# Patient Record
Sex: MALE | Race: WHITE | NOT HISPANIC OR LATINO | Employment: FULL TIME | URBAN - METROPOLITAN AREA
[De-identification: names, ages, dates, MRNs, and addresses within clinical notes are randomized per-mention and may not be internally consistent; named-entity substitution may affect disease eponyms.]

---

## 2023-04-15 LAB
ALANINE AMINOTRANSFERASE (SGPT) (U/L) IN SER/PLAS: 39 U/L (ref 10–52)
ALBUMIN (G/DL) IN SER/PLAS: 4.3 G/DL (ref 3.4–5)
ALBUMIN (MG/L) IN URINE: 22.5 MG/L
ALBUMIN/CREATININE (UG/MG) IN URINE: 10.8 UG/MG CRT (ref 0–30)
ALKALINE PHOSPHATASE (U/L) IN SER/PLAS: 55 U/L (ref 33–120)
ANION GAP IN SER/PLAS: 9 MMOL/L (ref 10–20)
ASPARTATE AMINOTRANSFERASE (SGOT) (U/L) IN SER/PLAS: 22 U/L (ref 9–39)
BILIRUBIN TOTAL (MG/DL) IN SER/PLAS: 0.9 MG/DL (ref 0–1.2)
CALCIUM (MG/DL) IN SER/PLAS: 9.4 MG/DL (ref 8.6–10.3)
CARBON DIOXIDE, TOTAL (MMOL/L) IN SER/PLAS: 30 MMOL/L (ref 21–32)
CHLORIDE (MMOL/L) IN SER/PLAS: 107 MMOL/L (ref 98–107)
CHOLESTEROL (MG/DL) IN SER/PLAS: 96 MG/DL (ref 0–199)
CHOLESTEROL IN HDL (MG/DL) IN SER/PLAS: 39.3 MG/DL
CHOLESTEROL IN LDL (MG/DL) IN SER/PLAS BY DIRECT ASSAY: 54 MG/DL (ref 0–129)
CHOLESTEROL/HDL RATIO: 2.4
CREATININE (MG/DL) IN SER/PLAS: 0.8 MG/DL (ref 0.5–1.3)
CREATININE (MG/DL) IN URINE: 209 MG/DL (ref 20–370)
ERYTHROCYTE DISTRIBUTION WIDTH (RATIO) BY AUTOMATED COUNT: 12.8 % (ref 11.5–14.5)
ERYTHROCYTE MEAN CORPUSCULAR HEMOGLOBIN CONCENTRATION (G/DL) BY AUTOMATED: 34.1 G/DL (ref 32–36)
ERYTHROCYTE MEAN CORPUSCULAR VOLUME (FL) BY AUTOMATED COUNT: 90 FL (ref 80–100)
ERYTHROCYTES (10*6/UL) IN BLOOD BY AUTOMATED COUNT: 4.87 X10E12/L (ref 4.5–5.9)
ESTIMATED AVERAGE GLUCOSE FOR HBA1C: 160 MG/DL
GFR MALE: >90 ML/MIN/1.73M2
GLUCOSE (MG/DL) IN SER/PLAS: 130 MG/DL (ref 74–99)
HEMATOCRIT (%) IN BLOOD BY AUTOMATED COUNT: 43.7 % (ref 41–52)
HEMOGLOBIN (G/DL) IN BLOOD: 14.9 G/DL (ref 13.5–17.5)
HEMOGLOBIN A1C/HEMOGLOBIN TOTAL IN BLOOD: 7.2 %
LDL: 45 MG/DL (ref 0–99)
LEUKOCYTES (10*3/UL) IN BLOOD BY AUTOMATED COUNT: 5.2 X10E9/L (ref 4.4–11.3)
PLATELETS (10*3/UL) IN BLOOD AUTOMATED COUNT: 183 X10E9/L (ref 150–450)
POTASSIUM (MMOL/L) IN SER/PLAS: 4.8 MMOL/L (ref 3.5–5.3)
PROTEIN TOTAL: 6.9 G/DL (ref 6.4–8.2)
SODIUM (MMOL/L) IN SER/PLAS: 141 MMOL/L (ref 136–145)
THYROTROPIN (MIU/L) IN SER/PLAS BY DETECTION LIMIT <= 0.05 MIU/L: 1.9 MIU/L (ref 0.44–3.98)
TRIGLYCERIDE (MG/DL) IN SER/PLAS: 59 MG/DL (ref 0–149)
UREA NITROGEN (MG/DL) IN SER/PLAS: 19 MG/DL (ref 6–23)
VLDL: 12 MG/DL (ref 0–40)

## 2023-04-17 PROBLEM — E78.5 HYPERLIPIDEMIA: Status: ACTIVE | Noted: 2023-04-17

## 2023-04-17 PROBLEM — K21.9 GERD (GASTROESOPHAGEAL REFLUX DISEASE): Status: ACTIVE | Noted: 2023-04-17

## 2023-04-17 PROBLEM — E11.9 DIABETES TYPE 2, CONTROLLED (MULTI): Status: ACTIVE | Noted: 2023-04-17

## 2023-04-17 PROBLEM — R19.03 ABDOMINAL MASS, RLQ (RIGHT LOWER QUADRANT): Status: RESOLVED | Noted: 2023-04-17 | Resolved: 2023-04-17

## 2023-04-17 PROBLEM — I10 ESSENTIAL HYPERTENSION, BENIGN: Status: ACTIVE | Noted: 2023-04-17

## 2023-04-17 PROBLEM — R93.1 AGATSTON CORONARY ARTERY CALCIUM SCORE LESS THAN 100: Status: ACTIVE | Noted: 2023-04-17

## 2023-04-17 RX ORDER — ATORVASTATIN CALCIUM 20 MG/1
1 TABLET, FILM COATED ORAL NIGHTLY
COMMUNITY
Start: 2020-06-04 | End: 2023-05-11 | Stop reason: SDUPTHER

## 2023-04-17 RX ORDER — ASPIRIN 81 MG/1
1 TABLET ORAL DAILY
COMMUNITY
Start: 2021-02-12 | End: 2023-05-11 | Stop reason: SDUPTHER

## 2023-04-17 RX ORDER — METFORMIN HYDROCHLORIDE 1000 MG/1
1 TABLET ORAL 2 TIMES DAILY
COMMUNITY
Start: 2022-06-10 | End: 2023-05-11 | Stop reason: SDUPTHER

## 2023-04-17 RX ORDER — PANTOPRAZOLE SODIUM 40 MG/1
1 TABLET, DELAYED RELEASE ORAL DAILY
COMMUNITY
Start: 2020-06-04 | End: 2023-10-20 | Stop reason: SDUPTHER

## 2023-04-17 RX ORDER — HYDROCHLOROTHIAZIDE 12.5 MG/1
1 TABLET ORAL DAILY
COMMUNITY
Start: 2020-06-04 | End: 2023-05-11 | Stop reason: SDUPTHER

## 2023-04-17 RX ORDER — LISINOPRIL 10 MG/1
1 TABLET ORAL DAILY
COMMUNITY
Start: 2020-06-04 | End: 2023-05-11 | Stop reason: SDUPTHER

## 2023-04-17 NOTE — ASSESSMENT & PLAN NOTE
A1c up a little from 6.8 to 7.2  Continue current medication  Work on diet reviewed with patient.  Reevaluate in 4 months.

## 2023-04-17 NOTE — PATIENT INSTRUCTIONS
I would like you to follow up in 4 months  Please have all labs that were ordered done at least 1 week prior to your visit.    Continue current medication.  Continue work on diet - recommend lots of fruits and vegetables, lean protein like chicken, turkey, fish, beans and Greek yogurt. Try to choose healthier carbohydrate options like oatmeal, wheat bread and pasta, sweet potatoes. Limit sugary treats.  Check a fasting sugar first thing in the AM twice daily and keep a log of the results to bring to your next office visit.  Please contact office if your sugars are consistently >140.  Reevaluate in 4 months    Recommend healthy diet based around fruits, vegetables, and lean proteins such as chicken, turkey, fish, and beans.  Also include moderate portions of healthy carbohydrates such as wheat bread and pasta, sweet potatoes. Limit sweets and alcoholic beverages. Try not drink more than 100 calories in beverages daily.   It is important to get a protein-rich breakfast daily such as oatmeal, eggs or Greek yogurt.  Increase activity as able to a recommended goal of at least 30 minutes of cardiovascular exercise (walking, swimming, biking, jogging etc.) at least 5 days weekly and a goal of 45 minutes or more most days of the week for weight loss. This exercise can be done all at one time or broken up into 2 or more sessions throughout the day.  .

## 2023-04-17 NOTE — PROGRESS NOTES
"Subjective   Patient ID: Alberto Zacarias is a 57 y.o. male who presents for follow up labs.    HPI  Patient presents today for follow up labs and chronic conditions.  Patient feels well. No other complaints or concerns.    The patient's relevant past medical, surgical, family, and social history was reviewed in [facility].  All pertinent lab work and results for this visit were reviewed with patient.      Review of Systems   A complete review of systems was performed and all systems were normal except what is noted in the HPI.        Objective   /82   Pulse 55   Temp 36.7 °C (98.1 °F)   Ht 1.778 m (5' 10\")   Wt 98.2 kg (216 lb 6.4 oz)   SpO2 94%   BMI 31.05 kg/m²    Physical Exam  Constitutional:       Appearance: Normal appearance. He is obese.   HENT:      Head: Normocephalic and atraumatic.   Neck:      Vascular: No carotid bruit.   Cardiovascular:      Rate and Rhythm: Normal rate and regular rhythm.      Heart sounds: Normal heart sounds.   Pulmonary:      Effort: Pulmonary effort is normal.      Breath sounds: Normal breath sounds. No wheezing, rhonchi or rales.   Abdominal:      General: Abdomen is flat. Bowel sounds are normal.      Palpations: Abdomen is soft.      Tenderness: There is no abdominal tenderness. There is no guarding.   Musculoskeletal:         General: Normal range of motion.      Right lower leg: No edema.      Left lower leg: No edema.   Skin:     General: Skin is dry.   Neurological:      General: No focal deficit present.      Mental Status: He is alert and oriented to person, place, and time.   Psychiatric:         Mood and Affect: Mood normal.         Behavior: Behavior normal.         Thought Content: Thought content normal.         Health Maintenance Due   Topic Date Due    HIV Screening  Never done    Pneumococcal Vaccine: Pediatrics (0 to 5 Years) and At-Risk Patients (6 to 64 Years) (1 - PCV) Never done    Diabetes: Foot Exam  Never done    Diabetes: Retinopathy Screening  " Never done    Hepatitis C Screening  Never done    COVID-19 Vaccine (4 - Booster for Moderna series) 01/07/2023    DTaP/Tdap/Td Vaccines (2 - Td or Tdap) 01/11/2023    Yearly Adult Physical  06/11/2023    Declines prevnar 20 and boostrix - Risks, benefits and side effects reviewed with patient.      Assessment/Plan   Problem List Items Addressed This Visit       Diabetes type 2, controlled (CMS/HCC) - Primary     A1c up a little from 6.8 to 7.2  Continue current medication  Work on diet reviewed with patient.  Reevaluate in 4 months.             Relevant Orders    Comprehensive Metabolic Panel    Hemoglobin A1C    Vitamin B12    Essential hypertension, benign     Well controlled continue current medication. Reevaluate in 4 months.            Relevant Orders    Comprehensive Metabolic Panel    Hyperlipidemia     Well controlled continue current medication. Reevaluate in 4 months.            Relevant Orders    Cholesterol, LDL Direct    Lipid Panel     Other Visit Diagnoses       Encounter for hepatitis C screening test for low risk patient        Relevant Orders    Hepatitis C Antibody    Screening PSA (prostate specific antigen)        Relevant Orders    Prostate Specific Antigen              Patient understands and agrees with care plan.           Risa Dubose MD

## 2023-04-21 ENCOUNTER — OFFICE VISIT (OUTPATIENT)
Dept: PRIMARY CARE | Facility: CLINIC | Age: 58
End: 2023-04-21
Payer: COMMERCIAL

## 2023-04-21 VITALS
TEMPERATURE: 98.1 F | SYSTOLIC BLOOD PRESSURE: 130 MMHG | BODY MASS INDEX: 30.98 KG/M2 | HEIGHT: 70 IN | HEART RATE: 55 BPM | DIASTOLIC BLOOD PRESSURE: 82 MMHG | WEIGHT: 216.4 LBS | OXYGEN SATURATION: 94 %

## 2023-04-21 DIAGNOSIS — E78.2 MIXED HYPERLIPIDEMIA: ICD-10-CM

## 2023-04-21 DIAGNOSIS — Z11.59 ENCOUNTER FOR HEPATITIS C SCREENING TEST FOR LOW RISK PATIENT: ICD-10-CM

## 2023-04-21 DIAGNOSIS — E11.9 CONTROLLED TYPE 2 DIABETES MELLITUS WITHOUT COMPLICATION, WITHOUT LONG-TERM CURRENT USE OF INSULIN (MULTI): Primary | ICD-10-CM

## 2023-04-21 DIAGNOSIS — I10 ESSENTIAL HYPERTENSION, BENIGN: ICD-10-CM

## 2023-04-21 DIAGNOSIS — Z12.5 SCREENING PSA (PROSTATE SPECIFIC ANTIGEN): ICD-10-CM

## 2023-04-21 PROCEDURE — 1036F TOBACCO NON-USER: CPT | Performed by: FAMILY MEDICINE

## 2023-04-21 PROCEDURE — 3079F DIAST BP 80-89 MM HG: CPT | Performed by: FAMILY MEDICINE

## 2023-04-21 PROCEDURE — 3075F SYST BP GE 130 - 139MM HG: CPT | Performed by: FAMILY MEDICINE

## 2023-04-21 PROCEDURE — 4010F ACE/ARB THERAPY RXD/TAKEN: CPT | Performed by: FAMILY MEDICINE

## 2023-04-21 PROCEDURE — 3051F HG A1C>EQUAL 7.0%<8.0%: CPT | Performed by: FAMILY MEDICINE

## 2023-04-21 PROCEDURE — 99214 OFFICE O/P EST MOD 30 MIN: CPT | Performed by: FAMILY MEDICINE

## 2023-04-21 ASSESSMENT — PATIENT HEALTH QUESTIONNAIRE - PHQ9
1. LITTLE INTEREST OR PLEASURE IN DOING THINGS: NOT AT ALL
SUM OF ALL RESPONSES TO PHQ9 QUESTIONS 1 AND 2: 0
2. FEELING DOWN, DEPRESSED OR HOPELESS: NOT AT ALL

## 2023-05-11 ENCOUNTER — TELEPHONE (OUTPATIENT)
Dept: PRIMARY CARE | Facility: CLINIC | Age: 58
End: 2023-05-11
Payer: COMMERCIAL

## 2023-05-11 DIAGNOSIS — E11.9 CONTROLLED TYPE 2 DIABETES MELLITUS WITHOUT COMPLICATION, WITHOUT LONG-TERM CURRENT USE OF INSULIN (MULTI): ICD-10-CM

## 2023-05-11 DIAGNOSIS — R93.1 AGATSTON CORONARY ARTERY CALCIUM SCORE LESS THAN 100: ICD-10-CM

## 2023-05-11 DIAGNOSIS — E78.2 MIXED HYPERLIPIDEMIA: ICD-10-CM

## 2023-05-11 DIAGNOSIS — I10 ESSENTIAL HYPERTENSION, BENIGN: ICD-10-CM

## 2023-05-11 NOTE — TELEPHONE ENCOUNTER
Rx Refill Request Telephone Encounter    Name:  Alberto Zacarias  :  [id number]  Medication Name:      Aspirin 81 MG EC Tablet    Atorvastatin 20 MG Tablet    Hydrochlorothiazide 12/5 MG Tablet    Lisinopril 10 MG Tablet    Metformin 1000 MG Tablet    Pantoprazole 40 MG EC Tablet    Propranolol XL 80 MG 24 HR Capsule       Specific Pharmacy location: [email or web address] - [address]    Date of last appointment: 23   Date of next appointment: 10/20/23   Best number to reach patient: [phone]

## 2023-05-12 RX ORDER — ASPIRIN 81 MG/1
81 TABLET ORAL DAILY
Qty: 90 TABLET | Refills: 2 | Status: SHIPPED | OUTPATIENT
Start: 2023-05-12

## 2023-05-12 RX ORDER — ATORVASTATIN CALCIUM 20 MG/1
20 TABLET, FILM COATED ORAL NIGHTLY
Qty: 90 TABLET | Refills: 2 | Status: SHIPPED | OUTPATIENT
Start: 2023-05-12 | End: 2023-10-20 | Stop reason: SDUPTHER

## 2023-05-12 RX ORDER — HYDROCHLOROTHIAZIDE 12.5 MG/1
12.5 TABLET ORAL DAILY
Qty: 90 TABLET | Refills: 2 | Status: SHIPPED | OUTPATIENT
Start: 2023-05-12 | End: 2023-10-20 | Stop reason: SDUPTHER

## 2023-05-12 RX ORDER — LISINOPRIL 10 MG/1
10 TABLET ORAL DAILY
Qty: 90 TABLET | Refills: 2 | Status: SHIPPED | OUTPATIENT
Start: 2023-05-12 | End: 2023-10-20 | Stop reason: SDUPTHER

## 2023-05-12 RX ORDER — METFORMIN HYDROCHLORIDE 1000 MG/1
1000 TABLET ORAL 2 TIMES DAILY
Qty: 180 TABLET | Refills: 2 | Status: SHIPPED | OUTPATIENT
Start: 2023-05-12 | End: 2023-05-17 | Stop reason: SDUPTHER

## 2023-05-15 ENCOUNTER — TELEPHONE (OUTPATIENT)
Dept: PRIMARY CARE | Facility: CLINIC | Age: 58
End: 2023-05-15
Payer: COMMERCIAL

## 2023-05-15 DIAGNOSIS — R93.1 AGATSTON CORONARY ARTERY CALCIUM SCORE LESS THAN 100: ICD-10-CM

## 2023-05-15 NOTE — TELEPHONE ENCOUNTER
Rx Refill Request Telephone Encounter  I sent this medication Friday to be refilled but there was a problem with it originally. The pharmacy was attempting to charge Alberto $1,800 for the Innopran, and it needs to be resubmitted as Inderal. He did get this filled in late April and I made it clear to him that he would not receive this medication with his others and he was fine with that.   Name:  Alberto Zacarias  :  [id number]  Medication Name:  Propanolol (Inderal) 80 MG 24 HR Capsule       Specific Pharmacy location: [email or web address] - [address]    Date of last appointment: 23   Date of next appointment: 10/20/23  Best number to reach patient: [phone]

## 2023-05-17 ENCOUNTER — TELEPHONE (OUTPATIENT)
Dept: PRIMARY CARE | Facility: CLINIC | Age: 58
End: 2023-05-17
Payer: COMMERCIAL

## 2023-05-17 DIAGNOSIS — E11.9 CONTROLLED TYPE 2 DIABETES MELLITUS WITHOUT COMPLICATION, WITHOUT LONG-TERM CURRENT USE OF INSULIN (MULTI): ICD-10-CM

## 2023-05-17 RX ORDER — METFORMIN HYDROCHLORIDE 1000 MG/1
1000 TABLET ORAL 2 TIMES DAILY
Qty: 60 TABLET | Refills: 0 | Status: SHIPPED | OUTPATIENT
Start: 2023-05-17 | End: 2023-08-04 | Stop reason: SDUPTHER

## 2023-05-17 NOTE — TELEPHONE ENCOUNTER
Patient is totally out of his Metformin 1000 MG  and he is asking for a short supply to be sent to [facility] in [place] if possible. Can we please pend to Dr. Dubose?

## 2023-06-05 ENCOUNTER — TELEPHONE (OUTPATIENT)
Dept: PRIMARY CARE | Facility: CLINIC | Age: 58
End: 2023-06-05
Payer: COMMERCIAL

## 2023-06-05 NOTE — TELEPHONE ENCOUNTER
----- Message from Risa Dubose MD sent at 6/5/2023  2:25 PM EDT -----  Please notify patient coronary calcium score is 24. It has increased from a score of 4 in 2019 but is still overall low. Encourage heart healthy diet, Increase physical activity as able. No other changes at this time.

## 2023-07-06 DIAGNOSIS — Z12.11 ENCOUNTER FOR SCREENING FOR MALIGNANT NEOPLASM OF COLON: Primary | ICD-10-CM

## 2023-08-04 ENCOUNTER — TELEPHONE (OUTPATIENT)
Dept: PRIMARY CARE | Facility: CLINIC | Age: 58
End: 2023-08-04
Payer: COMMERCIAL

## 2023-08-04 DIAGNOSIS — E11.9 CONTROLLED TYPE 2 DIABETES MELLITUS WITHOUT COMPLICATION, WITHOUT LONG-TERM CURRENT USE OF INSULIN (MULTI): ICD-10-CM

## 2023-08-04 RX ORDER — METFORMIN HYDROCHLORIDE 1000 MG/1
1000 TABLET ORAL 2 TIMES DAILY
Qty: 180 TABLET | Refills: 3 | Status: SHIPPED | OUTPATIENT
Start: 2023-08-04 | End: 2023-10-20 | Stop reason: SDUPTHER

## 2023-08-04 NOTE — TELEPHONE ENCOUNTER
Rx Refill Request Telephone Encounter    Name:  Alberto Zacarias  :  [id number]  Medication Name:        Metformin 1000 MG Tablet      Specific Pharmacy location:   [facility] - [address]         Date of last appointment:  23  Date of next appointment: 10/20/23   Best number to reach patient: [phone]

## 2023-09-27 LAB — NONINV COLON CA DNA+OCC BLD SCRN STL QL: NEGATIVE

## 2023-09-30 ENCOUNTER — LAB (OUTPATIENT)
Dept: LAB | Facility: LAB | Age: 58
End: 2023-09-30
Payer: COMMERCIAL

## 2023-09-30 DIAGNOSIS — E78.2 MIXED HYPERLIPIDEMIA: ICD-10-CM

## 2023-09-30 DIAGNOSIS — Z11.59 ENCOUNTER FOR HEPATITIS C SCREENING TEST FOR LOW RISK PATIENT: ICD-10-CM

## 2023-09-30 DIAGNOSIS — Z12.5 SCREENING PSA (PROSTATE SPECIFIC ANTIGEN): ICD-10-CM

## 2023-09-30 DIAGNOSIS — I10 ESSENTIAL HYPERTENSION, BENIGN: ICD-10-CM

## 2023-09-30 DIAGNOSIS — E11.9 CONTROLLED TYPE 2 DIABETES MELLITUS WITHOUT COMPLICATION, WITHOUT LONG-TERM CURRENT USE OF INSULIN (MULTI): ICD-10-CM

## 2023-09-30 LAB
ALBUMIN SERPL BCP-MCNC: 4.4 G/DL (ref 3.4–5)
ALP SERPL-CCNC: 54 U/L (ref 33–120)
ALT SERPL W P-5'-P-CCNC: 24 U/L (ref 10–52)
ANION GAP SERPL CALC-SCNC: 11 MMOL/L (ref 10–20)
AST SERPL W P-5'-P-CCNC: 20 U/L (ref 9–39)
BILIRUB SERPL-MCNC: 0.8 MG/DL (ref 0–1.2)
BUN SERPL-MCNC: 23 MG/DL (ref 6–23)
CALCIUM SERPL-MCNC: 9.2 MG/DL (ref 8.6–10.3)
CHLORIDE SERPL-SCNC: 107 MMOL/L (ref 98–107)
CHOLEST SERPL-MCNC: 119 MG/DL (ref 0–199)
CHOLESTEROL/HDL RATIO: 2.4
CO2 SERPL-SCNC: 29 MMOL/L (ref 21–32)
CREAT SERPL-MCNC: 0.77 MG/DL (ref 0.5–1.3)
GFR SERPL CREATININE-BSD FRML MDRD: >90 ML/MIN/1.73M*2
GLUCOSE SERPL-MCNC: 93 MG/DL (ref 74–99)
HDLC SERPL-MCNC: 49.1 MG/DL
LDLC SERPL CALC-MCNC: 61 MG/DL (ref 140–190)
NON HDL CHOLESTEROL: 70 MG/DL (ref 0–149)
POTASSIUM SERPL-SCNC: 4.3 MMOL/L (ref 3.5–5.3)
PROT SERPL-MCNC: 6.8 G/DL (ref 6.4–8.2)
PSA SERPL-MCNC: 0.48 NG/ML
SODIUM SERPL-SCNC: 143 MMOL/L (ref 136–145)
TRIGL SERPL-MCNC: 43 MG/DL (ref 0–149)
VIT B12 SERPL-MCNC: 209 PG/ML (ref 211–911)
VLDL: 9 MG/DL (ref 0–40)

## 2023-09-30 PROCEDURE — 84153 ASSAY OF PSA TOTAL: CPT

## 2023-09-30 PROCEDURE — 80061 LIPID PANEL: CPT

## 2023-09-30 PROCEDURE — 83721 ASSAY OF BLOOD LIPOPROTEIN: CPT

## 2023-09-30 PROCEDURE — 80053 COMPREHEN METABOLIC PANEL: CPT

## 2023-09-30 PROCEDURE — 83036 HEMOGLOBIN GLYCOSYLATED A1C: CPT

## 2023-09-30 PROCEDURE — 36415 COLL VENOUS BLD VENIPUNCTURE: CPT

## 2023-09-30 PROCEDURE — 82607 VITAMIN B-12: CPT

## 2023-10-01 LAB
EST. AVERAGE GLUCOSE BLD GHB EST-MCNC: 128 MG/DL
HBA1C MFR BLD: 6.1 %
Lab: 59 MG/DL

## 2023-10-09 PROBLEM — Z00.00 ANNUAL PHYSICAL EXAM: Status: ACTIVE | Noted: 2023-10-09

## 2023-10-09 PROBLEM — E53.8 B12 DEFICIENCY: Status: ACTIVE | Noted: 2023-10-09

## 2023-10-13 PROBLEM — E78.2 MIXED HYPERLIPIDEMIA: Status: ACTIVE | Noted: 2023-04-17

## 2023-10-13 PROBLEM — E66.09 CLASS 1 OBESITY DUE TO EXCESS CALORIES WITH SERIOUS COMORBIDITY AND BODY MASS INDEX (BMI) OF 31.0 TO 31.9 IN ADULT: Status: ACTIVE | Noted: 2023-10-13

## 2023-10-13 PROBLEM — E66.811 CLASS 1 OBESITY DUE TO EXCESS CALORIES WITH SERIOUS COMORBIDITY AND BODY MASS INDEX (BMI) OF 31.0 TO 31.9 IN ADULT: Status: ACTIVE | Noted: 2023-10-13

## 2023-10-13 NOTE — PROGRESS NOTES
Subjective   Patient ID: Alberto Zacarias is a 57 y.o. male who presents for Annual Exam.    HPI  Patient presents today for Annual Physical.  Working on diet - weight down 16 lbs.    Patient otherwise feels well. No other complaints or concerns.    The patient's relevant past medical, surgical, family, and social history was reviewed in [facility].  All pertinent lab work and results for this visit were reviewed with patient.    Lab on 09/30/2023   Component Date Value Ref Range Status    LDL, Direct 09/30/2023 59  mg/dL Final    Cholesterol 09/30/2023 119  0 - 199 mg/dL Final          Age      Desirable   Borderline High   High     0-19 Y     0 - 169       170 - 199     >/= 200    20-24 Y     0 - 189       190 - 224     >/= 225         >24 Y     0 - 199       200 - 239     >/= 240   **All ranges are based on fasting samples. Specific   therapeutic targets will vary based on patient-specific   cardiac risk.    Pediatric guidelines reference:Pediatrics 2011, 128(S5).Adult guidelines reference: NCEP ATPIII Guidelines,GIL 2001, 258:2486-97    Venipuncture immediately after or during the administration of Metamizole may lead to falsely low results. Testing should be performed immediately prior to Metamizole dosing.    HDL-Cholesterol 09/30/2023 49.1  mg/dL Final      Age       Very Low   Low     Normal    High    0-19 Y    < 35      < 40     40-45     ----  20-24 Y    ----     < 40      >45      ----        >24 Y      ----     < 40     40-60      >60      Cholesterol/HDL Ratio 09/30/2023 2.4   Final      Ref Values  Desirable  < 3.4  High Risk  > 5.0    LDL Calculated 09/30/2023 61 (L)  140 - 190 mg/dL Final                                Near   Borderline      AGE      Desirable  Optimal    High     High     Very High     0-19 Y     0 - 109     ---    110-129   >/= 130     ----    20-24 Y     0 - 119     ---    120-159   >/= 160     ----      >24 Y     0 -  99   100-129  130-159   160-189     >/=190      VLDL 09/30/2023  9  0 - 40 mg/dL Final    Triglycerides 09/30/2023 43  0 - 149 mg/dL Final       Age         Desirable   Borderline High   High     Very High   0 D-90 D    19 - 174         ----         ----        ----  91 D- 9 Y     0 -  74        75 -  99     >/= 100      ----    10-19 Y     0 -  89        90 - 129     >/= 130      ----    20-24 Y     0 - 114       115 - 149     >/= 150      ----         >24 Y     0 - 149       150 - 199    200- 499    >/= 500    Venipuncture immediately after or during the administration of Metamizole may lead to falsely low results. Testing should be performed immediately prior to Metamizole dosing.    Non HDL Cholesterol 09/30/2023 70  0 - 149 mg/dL Final          Age       Desirable   Borderline High   High     Very High     0-19 Y     0 - 119       120 - 144     >/= 145    >/= 160    20-24 Y     0 - 149       150 - 189     >/= 190      ----         >24 Y    30 mg/dL above LDL Cholesterol goal      Glucose 09/30/2023 93  74 - 99 mg/dL Final    Sodium 09/30/2023 143  136 - 145 mmol/L Final    Potassium 09/30/2023 4.3  3.5 - 5.3 mmol/L Final    Chloride 09/30/2023 107  98 - 107 mmol/L Final    Bicarbonate 09/30/2023 29  21 - 32 mmol/L Final    Anion Gap 09/30/2023 11  10 - 20 mmol/L Final    Urea Nitrogen 09/30/2023 23  6 - 23 mg/dL Final    Creatinine 09/30/2023 0.77  0.50 - 1.30 mg/dL Final    eGFR 09/30/2023 >90  >60 mL/min/1.73m*2 Final    Calculations of estimated GFR are performed using the 2021 CKD-EPI Study Refit  equation without the race variable for the IDMS-Traceable Creatinine Methods.    https://jasn.asnjournals.org/content/early/2021/09/22/ASN.5534755085    Calcium 09/30/2023 9.2  8.6 - 10.3 mg/dL Final    Albumin 09/30/2023 4.4  3.4 - 5.0 g/dL Final    Alkaline Phosphatase 09/30/2023 54  33 - 120 U/L Final    Total Protein 09/30/2023 6.8  6.4 - 8.2 g/dL Final    AST 09/30/2023 20  9 - 39 U/L Final    Bilirubin, Total 09/30/2023 0.8  0.0 - 1.2 mg/dL Final    ALT 09/30/2023 24  10  "- 52 U/L Final    Patients treated with Sulfasalazine may generate falsely decreased results for ALT.    Hemoglobin A1C 09/30/2023 6.1 (H)  see below % Final    Estimated Average Glucose 09/30/2023 128  Not Established mg/dL Final    Prostate Specific AG 09/30/2023 0.48  <=4.00 ng/mL Final    Vitamin B12 09/30/2023 209 (L)  211 - 911 pg/mL Final           Review of Systems   A complete review of systems was performed and all systems were normal except what is noted in the HPI.        Objective   /84   Pulse 55   Temp 36.4 °C (97.6 °F)   Ht 1.778 m (5' 10\")   Wt 90.7 kg (200 lb)   SpO2 95%   BMI 28.70 kg/m²    Physical Exam  Constitutional:       Appearance: Normal appearance.   HENT:      Head: Normocephalic and atraumatic.   Neck:      Vascular: No carotid bruit.   Cardiovascular:      Rate and Rhythm: Normal rate and regular rhythm.      Heart sounds: Normal heart sounds.   Pulmonary:      Effort: Pulmonary effort is normal.      Breath sounds: Normal breath sounds. No wheezing, rhonchi or rales.   Abdominal:      General: Abdomen is flat. Bowel sounds are normal.      Palpations: Abdomen is soft.      Tenderness: There is no abdominal tenderness. There is no guarding.   Musculoskeletal:         General: Normal range of motion.      Right lower leg: No edema.      Left lower leg: No edema.   Skin:     General: Skin is dry.   Neurological:      General: No focal deficit present.      Mental Status: He is alert and oriented to person, place, and time.   Psychiatric:         Mood and Affect: Mood normal.         Behavior: Behavior normal.         Thought Content: Thought content normal.         Health Maintenance Due   Topic Date Due    HIV Screening  Never done    Pneumococcal Vaccine: Pediatrics (0 to 5 Years) and At-Risk Patients (6 to 64 Years) (1 - PCV) Never done    Diabetes: Foot Exam  Never done    Diabetes: Retinopathy Screening  Never done    Hepatitis C Screening  Never done    COVID-19 Vaccine " (4 - Moderna series) 01/07/2023    DTaP/Tdap/Td Vaccines (2 - Td or Tdap) 01/11/2023    Influenza Vaccine (1) Never done        Assessment/Plan   Problem List Items Addressed This Visit       Diabetes type 2, controlled (CMS/[facility])     A1c significantly improved to 6.1  Continue current medicationn ad work on diet  On ACE  On statin  Recheck 6 months          Relevant Medications    metFORMIN (Glucophage) 1,000 mg tablet    Other Relevant Orders    TSH with reflex to Free T4 if abnormal    Comprehensive Metabolic Panel    Albumin , Urine Random    Hemoglobin A1C    Essential hypertension, benign     Well controlled continue current medication. Reevaluate in 6 months.            Relevant Medications    hydroCHLOROthiazide (HYDRODiuril) 12.5 mg tablet    lisinopril 10 mg tablet    Other Relevant Orders    TSH with reflex to Free T4 if abnormal    Comprehensive Metabolic Panel    GERD (gastroesophageal reflux disease)    Relevant Medications    pantoprazole (ProtoNix) 40 mg EC tablet    Mixed hyperlipidemia     Well controlled continue current medication. Reevaluate in 6 months.            Relevant Medications    atorvastatin (Lipitor) 20 mg tablet    Other Relevant Orders    TSH with reflex to Free T4 if abnormal    Lipid Panel    Cholesterol, LDL Direct    Annual physical exam - Primary     Yearly physical done.  Flu shot, Boostrix, Prevnar 20 declined  Shingrix series complete.  PSA 10/23  Cologuard 9/23  Nonsmoker         B12 deficiency     B12 low  B12 injection today  Start OTC b12 as directed  Recheck 6 months          Relevant Medications    cyanocobalamin (Vitamin B-12) injection 1,000 mcg (Completed)    Other Relevant Orders    CBC    Vitamin B12    BMI 28.0-28.9,adult     Work on diet reviewed with patient - doing well - down 16 lbs   Recommend at least 150 minutes of cardiovascular exercise weekly.           Other Visit Diagnoses       Encounter for hepatitis C screening test for low risk patient         Relevant Orders    Hepatitis C Antibody              Patient understands and agrees with care plan.           Risa Dubose MD

## 2023-10-13 NOTE — ASSESSMENT & PLAN NOTE
Work on diet reviewed with patient - doing well - down 16 lbs   Recommend at least 150 minutes of cardiovascular exercise weekly.

## 2023-10-13 NOTE — ASSESSMENT & PLAN NOTE
Yearly physical done.  Flu shot, Boostrix, Prevnar 20 declined  Shingrix series complete.  PSA 10/23  Cologuard 9/23  Nonsmoker

## 2023-10-13 NOTE — PATIENT INSTRUCTIONS
I would like you to start OTC b12 1000 mcg daily.    I would like you to follow up in 6 months  Please have all labs that were ordered done at least 1 week prior to your visit.     Recommend healthy diet based around fruits, vegetables, and lean proteins such as chicken, turkey, fish, and beans.  Also include moderate portions of healthy carbohydrates such as wheat bread and pasta, sweet potatoes. Limit sweets and alcoholic beverages. Try not drink more than 100 calories in beverages daily.   It is important to get a protein-rich breakfast daily such as oatmeal, eggs or Greek yogurt.  Increase activity as able to a recommended goal of at least 30 minutes of cardiovascular exercise (walking, swimming, biking, jogging etc.) at least 5 days weekly and a goal of 45 minutes or more most days of the week for weight loss. This exercise can be done all at one time or broken up into 2 or more sessions throughout the day.

## 2023-10-13 NOTE — ASSESSMENT & PLAN NOTE
A1c significantly improved to 6.1  Continue current medicationn ad work on diet  On ACE  On statin  Recheck 6 months

## 2023-10-20 ENCOUNTER — OFFICE VISIT (OUTPATIENT)
Dept: PRIMARY CARE | Facility: CLINIC | Age: 58
End: 2023-10-20
Payer: COMMERCIAL

## 2023-10-20 VITALS
OXYGEN SATURATION: 95 % | SYSTOLIC BLOOD PRESSURE: 127 MMHG | HEIGHT: 70 IN | TEMPERATURE: 97.6 F | DIASTOLIC BLOOD PRESSURE: 84 MMHG | BODY MASS INDEX: 28.63 KG/M2 | HEART RATE: 55 BPM | WEIGHT: 200 LBS

## 2023-10-20 DIAGNOSIS — K21.9 GASTROESOPHAGEAL REFLUX DISEASE, UNSPECIFIED WHETHER ESOPHAGITIS PRESENT: ICD-10-CM

## 2023-10-20 DIAGNOSIS — E11.9 CONTROLLED TYPE 2 DIABETES MELLITUS WITHOUT COMPLICATION, WITHOUT LONG-TERM CURRENT USE OF INSULIN (MULTI): ICD-10-CM

## 2023-10-20 DIAGNOSIS — Z11.59 ENCOUNTER FOR HEPATITIS C SCREENING TEST FOR LOW RISK PATIENT: ICD-10-CM

## 2023-10-20 DIAGNOSIS — E53.8 B12 DEFICIENCY: ICD-10-CM

## 2023-10-20 DIAGNOSIS — E78.2 MIXED HYPERLIPIDEMIA: ICD-10-CM

## 2023-10-20 DIAGNOSIS — Z00.00 ANNUAL PHYSICAL EXAM: Primary | ICD-10-CM

## 2023-10-20 DIAGNOSIS — I10 ESSENTIAL HYPERTENSION, BENIGN: ICD-10-CM

## 2023-10-20 PROCEDURE — 1036F TOBACCO NON-USER: CPT | Performed by: FAMILY MEDICINE

## 2023-10-20 PROCEDURE — 99214 OFFICE O/P EST MOD 30 MIN: CPT | Performed by: FAMILY MEDICINE

## 2023-10-20 PROCEDURE — 3044F HG A1C LEVEL LT 7.0%: CPT | Performed by: FAMILY MEDICINE

## 2023-10-20 PROCEDURE — 3079F DIAST BP 80-89 MM HG: CPT | Performed by: FAMILY MEDICINE

## 2023-10-20 PROCEDURE — 4010F ACE/ARB THERAPY RXD/TAKEN: CPT | Performed by: FAMILY MEDICINE

## 2023-10-20 PROCEDURE — 3008F BODY MASS INDEX DOCD: CPT | Performed by: FAMILY MEDICINE

## 2023-10-20 PROCEDURE — 96372 THER/PROPH/DIAG INJ SC/IM: CPT | Performed by: NURSE PRACTITIONER

## 2023-10-20 PROCEDURE — 3074F SYST BP LT 130 MM HG: CPT | Performed by: FAMILY MEDICINE

## 2023-10-20 PROCEDURE — 99396 PREV VISIT EST AGE 40-64: CPT | Performed by: FAMILY MEDICINE

## 2023-10-20 PROCEDURE — 3048F LDL-C <100 MG/DL: CPT | Performed by: FAMILY MEDICINE

## 2023-10-20 RX ORDER — PANTOPRAZOLE SODIUM 40 MG/1
40 TABLET, DELAYED RELEASE ORAL DAILY
Qty: 90 TABLET | Refills: 3 | Status: SHIPPED | OUTPATIENT
Start: 2023-10-20 | End: 2024-10-19

## 2023-10-20 RX ORDER — CYANOCOBALAMIN 1000 UG/ML
1000 INJECTION, SOLUTION INTRAMUSCULAR; SUBCUTANEOUS ONCE
Status: COMPLETED | OUTPATIENT
Start: 2023-10-20 | End: 2023-10-20

## 2023-10-20 RX ORDER — HYDROCHLOROTHIAZIDE 12.5 MG/1
12.5 TABLET ORAL DAILY
Qty: 90 TABLET | Refills: 3 | Status: SHIPPED | OUTPATIENT
Start: 2023-10-20 | End: 2024-10-19

## 2023-10-20 RX ORDER — CYANOCOBALAMIN 1000 UG/ML
1000 INJECTION, SOLUTION INTRAMUSCULAR; SUBCUTANEOUS ONCE
Status: CANCELLED | OUTPATIENT
Start: 2023-10-20 | End: 2023-10-20

## 2023-10-20 RX ORDER — METFORMIN HYDROCHLORIDE 1000 MG/1
1000 TABLET ORAL 2 TIMES DAILY
Qty: 180 TABLET | Refills: 3 | Status: SHIPPED | OUTPATIENT
Start: 2023-10-20 | End: 2024-10-19

## 2023-10-20 RX ORDER — LISINOPRIL 10 MG/1
10 TABLET ORAL DAILY
Qty: 90 TABLET | Refills: 3 | Status: SHIPPED | OUTPATIENT
Start: 2023-10-20 | End: 2024-10-19

## 2023-10-20 RX ORDER — ATORVASTATIN CALCIUM 20 MG/1
20 TABLET, FILM COATED ORAL NIGHTLY
Qty: 90 TABLET | Refills: 3 | Status: SHIPPED | OUTPATIENT
Start: 2023-10-20 | End: 2024-10-19

## 2023-10-20 RX ORDER — LANOLIN ALCOHOL/MO/W.PET/CERES
1000 CREAM (GRAM) TOPICAL DAILY
COMMUNITY

## 2023-10-20 RX ADMIN — CYANOCOBALAMIN 1000 MCG: 1000 INJECTION, SOLUTION INTRAMUSCULAR; SUBCUTANEOUS at 09:31

## 2023-10-20 ASSESSMENT — PATIENT HEALTH QUESTIONNAIRE - PHQ9
2. FEELING DOWN, DEPRESSED OR HOPELESS: NOT AT ALL
SUM OF ALL RESPONSES TO PHQ9 QUESTIONS 1 AND 2: 0
1. LITTLE INTEREST OR PLEASURE IN DOING THINGS: NOT AT ALL

## 2024-04-20 ENCOUNTER — LAB (OUTPATIENT)
Dept: LAB | Facility: LAB | Age: 59
End: 2024-04-20
Payer: COMMERCIAL

## 2024-04-20 DIAGNOSIS — E53.8 B12 DEFICIENCY: ICD-10-CM

## 2024-04-20 DIAGNOSIS — E11.9 CONTROLLED TYPE 2 DIABETES MELLITUS WITHOUT COMPLICATION, WITHOUT LONG-TERM CURRENT USE OF INSULIN (MULTI): ICD-10-CM

## 2024-04-20 DIAGNOSIS — I10 ESSENTIAL HYPERTENSION, BENIGN: ICD-10-CM

## 2024-04-20 DIAGNOSIS — E78.2 MIXED HYPERLIPIDEMIA: ICD-10-CM

## 2024-04-20 DIAGNOSIS — Z11.59 ENCOUNTER FOR HEPATITIS C SCREENING TEST FOR LOW RISK PATIENT: ICD-10-CM

## 2024-04-20 LAB
ALBUMIN SERPL BCP-MCNC: 4.6 G/DL (ref 3.4–5)
ALP SERPL-CCNC: 60 U/L (ref 33–120)
ALT SERPL W P-5'-P-CCNC: 31 U/L (ref 10–52)
ANION GAP SERPL CALC-SCNC: 9 MMOL/L (ref 10–20)
AST SERPL W P-5'-P-CCNC: 21 U/L (ref 9–39)
BILIRUB SERPL-MCNC: 0.8 MG/DL (ref 0–1.2)
BUN SERPL-MCNC: 16 MG/DL (ref 6–23)
CALCIUM SERPL-MCNC: 9.6 MG/DL (ref 8.6–10.3)
CHLORIDE SERPL-SCNC: 105 MMOL/L (ref 98–107)
CHOLEST SERPL-MCNC: 115 MG/DL (ref 0–199)
CHOLESTEROL/HDL RATIO: 2.3
CO2 SERPL-SCNC: 30 MMOL/L (ref 21–32)
CREAT SERPL-MCNC: 0.79 MG/DL (ref 0.5–1.3)
CREAT UR-MCNC: 161.2 MG/DL (ref 20–370)
EGFRCR SERPLBLD CKD-EPI 2021: >90 ML/MIN/1.73M*2
ERYTHROCYTE [DISTWIDTH] IN BLOOD BY AUTOMATED COUNT: 12.7 % (ref 11.5–14.5)
EST. AVERAGE GLUCOSE BLD GHB EST-MCNC: 151 MG/DL
GLUCOSE SERPL-MCNC: 133 MG/DL (ref 74–99)
HBA1C MFR BLD: 6.9 %
HCT VFR BLD AUTO: 46.6 % (ref 41–52)
HCV AB SER QL: NONREACTIVE
HDLC SERPL-MCNC: 49 MG/DL
HGB BLD-MCNC: 15.4 G/DL (ref 13.5–17.5)
LDLC SERPL CALC-MCNC: 56 MG/DL
LDLC SERPL DIRECT ASSAY-MCNC: 61 MG/DL (ref 0–129)
MCH RBC QN AUTO: 30.3 PG (ref 26–34)
MCHC RBC AUTO-ENTMCNC: 33 G/DL (ref 32–36)
MCV RBC AUTO: 92 FL (ref 80–100)
MICROALBUMIN UR-MCNC: 16.9 MG/L
MICROALBUMIN/CREAT UR: 10.5 UG/MG CREAT
NON HDL CHOLESTEROL: 66 MG/DL (ref 0–149)
NRBC BLD-RTO: 0 /100 WBCS (ref 0–0)
PLATELET # BLD AUTO: 197 X10*3/UL (ref 150–450)
POTASSIUM SERPL-SCNC: 5.1 MMOL/L (ref 3.5–5.3)
PROT SERPL-MCNC: 7 G/DL (ref 6.4–8.2)
RBC # BLD AUTO: 5.08 X10*6/UL (ref 4.5–5.9)
SODIUM SERPL-SCNC: 139 MMOL/L (ref 136–145)
TRIGL SERPL-MCNC: 49 MG/DL (ref 0–149)
TSH SERPL-ACNC: 2.76 MIU/L (ref 0.44–3.98)
VIT B12 SERPL-MCNC: 1006 PG/ML (ref 211–911)
VLDL: 10 MG/DL (ref 0–40)
WBC # BLD AUTO: 4.6 X10*3/UL (ref 4.4–11.3)

## 2024-04-20 PROCEDURE — 85027 COMPLETE CBC AUTOMATED: CPT

## 2024-04-20 PROCEDURE — 83721 ASSAY OF BLOOD LIPOPROTEIN: CPT

## 2024-04-20 PROCEDURE — 82607 VITAMIN B-12: CPT

## 2024-04-20 PROCEDURE — 82043 UR ALBUMIN QUANTITATIVE: CPT

## 2024-04-20 PROCEDURE — 80053 COMPREHEN METABOLIC PANEL: CPT

## 2024-04-20 PROCEDURE — 80061 LIPID PANEL: CPT

## 2024-04-20 PROCEDURE — 83036 HEMOGLOBIN GLYCOSYLATED A1C: CPT

## 2024-04-20 PROCEDURE — 86803 HEPATITIS C AB TEST: CPT

## 2024-04-20 PROCEDURE — 84443 ASSAY THYROID STIM HORMONE: CPT

## 2024-04-20 PROCEDURE — 82570 ASSAY OF URINE CREATININE: CPT

## 2024-04-20 PROCEDURE — 36415 COLL VENOUS BLD VENIPUNCTURE: CPT

## 2024-04-22 NOTE — ASSESSMENT & PLAN NOTE
A1c up from 6.1 to 6.9  Work on diet reviewed with patient.   Continue current medication  On statin  On ACE  No microalbuminuria   Recheck 6 months

## 2024-04-22 NOTE — PROGRESS NOTES
Subjective   Patient ID: Alberto Zacarias is a 58 y.o. male who presents for No chief complaint on file..  HPI  Patient presents today for follow up labs and chronic conditions.  Patient otherwise feels well. No other complaints or concerns.    The patient's relevant past medical, surgical, family, and social history was reviewed in [facility].  All pertinent lab work and results for this visit were reviewed with patient.    Lab on 04/20/2024   Component Date Value Ref Range Status    Thyroid Stimulating Hormone 04/20/2024 2.76  0.44 - 3.98 mIU/L Final    WBC 04/20/2024 4.6  4.4 - 11.3 x10*3/uL Final    nRBC 04/20/2024 0.0  0.0 - 0.0 /100 WBCs Final    RBC 04/20/2024 5.08  4.50 - 5.90 x10*6/uL Final    Hemoglobin 04/20/2024 15.4  13.5 - 17.5 g/dL Final    Hematocrit 04/20/2024 46.6  41.0 - 52.0 % Final    MCV 04/20/2024 92  80 - 100 fL Final    MCH 04/20/2024 30.3  26.0 - 34.0 pg Final    MCHC 04/20/2024 33.0  32.0 - 36.0 g/dL Final    RDW 04/20/2024 12.7  11.5 - 14.5 % Final    Platelets 04/20/2024 197  150 - 450 x10*3/uL Final    Cholesterol 04/20/2024 115  0 - 199 mg/dL Final          Age      Desirable   Borderline High   High     0-19 Y     0 - 169       170 - 199     >/= 200    20-24 Y     0 - 189       190 - 224     >/= 225         >24 Y     0 - 199       200 - 239     >/= 240   **All ranges are based on fasting samples. Specific   therapeutic targets will vary based on patient-specific   cardiac risk.    Pediatric guidelines reference:Pediatrics 2011, 128(S5).Adult guidelines reference: NCEP ATPIII Guidelines,GIL 2001, 258:2486-97    Venipuncture immediately after or during the administration of Metamizole may lead to falsely low results. Testing should be performed immediately prior to Metamizole dosing.    HDL-Cholesterol 04/20/2024 49.0  mg/dL Final      Age       Very Low   Low     Normal    High    0-19 Y    < 35      < 40     40-45     ----  20-24 Y    ----     < 40      >45      ----        >24 Y       ----     < 40     40-60      >60      Cholesterol/HDL Ratio 04/20/2024 2.3   Final      Ref Values  Desirable  < 3.4  High Risk  > 5.0    LDL Calculated 04/20/2024 56  <=99 mg/dL Final                                Near   Borderline      AGE      Desirable  Optimal    High     High     Very High     0-19 Y     0 - 109     ---    110-129   >/= 130     ----    20-24 Y     0 - 119     ---    120-159   >/= 160     ----      >24 Y     0 -  99   100-129  130-159   160-189     >/=190      VLDL 04/20/2024 10  0 - 40 mg/dL Final    Triglycerides 04/20/2024 49  0 - 149 mg/dL Final       Age         Desirable   Borderline High   High     Very High   0 D-90 D    19 - 174         ----         ----        ----  91 D- 9 Y     0 -  74        75 -  99     >/= 100      ----    10-19 Y     0 -  89        90 - 129     >/= 130      ----    20-24 Y     0 - 114       115 - 149     >/= 150      ----         >24 Y     0 - 149       150 - 199    200- 499    >/= 500    Venipuncture immediately after or during the administration of Metamizole may lead to falsely low results. Testing should be performed immediately prior to Metamizole dosing.    Non HDL Cholesterol 04/20/2024 66  0 - 149 mg/dL Final          Age       Desirable   Borderline High   High     Very High     0-19 Y     0 - 119       120 - 144     >/= 145    >/= 160    20-24 Y     0 - 149       150 - 189     >/= 190      ----         >24 Y    30 mg/dL above LDL Cholesterol goal      LDL, Direct 04/20/2024 61  0 - 129 mg/dL Final    Glucose 04/20/2024 133 (H)  74 - 99 mg/dL Final    Sodium 04/20/2024 139  136 - 145 mmol/L Final    Potassium 04/20/2024 5.1  3.5 - 5.3 mmol/L Final    Chloride 04/20/2024 105  98 - 107 mmol/L Final    Bicarbonate 04/20/2024 30  21 - 32 mmol/L Final    Anion Gap 04/20/2024 9 (L)  10 - 20 mmol/L Final    Urea Nitrogen 04/20/2024 16  6 - 23 mg/dL Final    Creatinine 04/20/2024 0.79  0.50 - 1.30 mg/dL Final    eGFR 04/20/2024 >90  >60 mL/min/1.73m*2 Final     Calculations of estimated GFR are performed using the 2021 CKD-EPI Study Refit equation without the race variable for the IDMS-Traceable creatinine methods.  https://jasn.asnjournals.org/content/early/2021/09/22/ASN.7909747133    Calcium 04/20/2024 9.6  8.6 - 10.3 mg/dL Final    Albumin 04/20/2024 4.6  3.4 - 5.0 g/dL Final    Alkaline Phosphatase 04/20/2024 60  33 - 120 U/L Final    Total Protein 04/20/2024 7.0  6.4 - 8.2 g/dL Final    AST 04/20/2024 21  9 - 39 U/L Final    Bilirubin, Total 04/20/2024 0.8  0.0 - 1.2 mg/dL Final    ALT 04/20/2024 31  10 - 52 U/L Final    Patients treated with Sulfasalazine may generate falsely decreased results for ALT.    Albumin, Urine Random 04/20/2024 16.9  Not established mg/L Final    Creatinine, Urine Random 04/20/2024 161.2  20.0 - 370.0 mg/dL Final    Albumin/Creatine Ratio 04/20/2024 10.5  <30.0 ug/mg Creat Final    Hemoglobin A1C 04/20/2024 6.9 (H)  see below % Final    Estimated Average Glucose 04/20/2024 151  Not Established mg/dL Final    Vitamin B12 04/20/2024 1,006 (H)  211 - 911 pg/mL Final    Hepatitis C AB 04/20/2024 Nonreactive  Nonreactive Final    Results from patients taking biotin supplements or receiving high-dose biotin therapy should be interpreted with caution due to possible interference with this test. Providers may contact their local laboratory for further information.           Review of Systems   A complete review of systems was performed and all systems were normal except what is noted in the HPI.        Objective   There were no vitals taken for this visit.   Physical Exam    Health Maintenance Due   Topic Date Due    HIV Screening  Never done    Pneumococcal Vaccine: Pediatrics (0 to 5 Years) and At-Risk Patients (6 to 64 Years) (1 of 2 - PCV) Never done    Diabetes: Foot Exam  Never done    Diabetes: Retinopathy Screening  Never done    DTaP/Tdap/Td Vaccines (2 - Td or Tdap) 01/11/2023    COVID-19 Vaccine (5 - 2023-24 season) 09/01/2023         Assessment/Plan   Problem List Items Addressed This Visit       Diabetes type 2, controlled (Multi) - Primary     A1c up from 6.1 to 6.9  Work on diet reviewed with patient.   Continue current medication  On statin  On ACE  No microalbuminuria   Recheck 6 months          Relevant Orders    Comprehensive Metabolic Panel    Hemoglobin A1C    Essential hypertension, benign     Well controlled continue current medication. Reevaluate in 6 months.            Relevant Orders    Comprehensive Metabolic Panel    Mixed hyperlipidemia     Well controlled continue current medication. Reevaluate in 6 months.            Relevant Orders    Cholesterol, LDL Direct    Lipid Panel    B12 deficiency     Well controlled. Continue current medicine and recheck in 6 months.           Relevant Orders    Vitamin B12     Other Visit Diagnoses       Screening PSA (prostate specific antigen)        Relevant Orders    Prostate Specific Antigen              Patient understands and agrees with care plan.           Risa Dubose MD

## 2024-04-26 ENCOUNTER — OFFICE VISIT (OUTPATIENT)
Dept: PRIMARY CARE | Facility: CLINIC | Age: 59
End: 2024-04-26
Payer: COMMERCIAL

## 2024-04-26 ENCOUNTER — APPOINTMENT (OUTPATIENT)
Dept: PRIMARY CARE | Facility: CLINIC | Age: 59
End: 2024-04-26
Payer: COMMERCIAL

## 2024-04-26 VITALS
DIASTOLIC BLOOD PRESSURE: 80 MMHG | BODY MASS INDEX: 29.6 KG/M2 | WEIGHT: 206.8 LBS | RESPIRATION RATE: 18 BRPM | TEMPERATURE: 96.7 F | HEIGHT: 70 IN | HEART RATE: 70 BPM | OXYGEN SATURATION: 95 % | SYSTOLIC BLOOD PRESSURE: 131 MMHG

## 2024-04-26 DIAGNOSIS — E78.2 MIXED HYPERLIPIDEMIA: Primary | ICD-10-CM

## 2024-04-26 DIAGNOSIS — Z13.29 THYROID DISORDER SCREEN: ICD-10-CM

## 2024-04-26 DIAGNOSIS — I10 ESSENTIAL HYPERTENSION, BENIGN: ICD-10-CM

## 2024-04-26 DIAGNOSIS — E11.9 CONTROLLED TYPE 2 DIABETES MELLITUS WITHOUT COMPLICATION, WITHOUT LONG-TERM CURRENT USE OF INSULIN (MULTI): ICD-10-CM

## 2024-04-26 DIAGNOSIS — Z12.5 SCREENING PSA (PROSTATE SPECIFIC ANTIGEN): ICD-10-CM

## 2024-04-26 DIAGNOSIS — E53.8 B12 DEFICIENCY: ICD-10-CM

## 2024-04-26 DIAGNOSIS — K21.9 GASTROESOPHAGEAL REFLUX DISEASE WITHOUT ESOPHAGITIS: ICD-10-CM

## 2024-04-26 PROCEDURE — G2211 COMPLEX E/M VISIT ADD ON: HCPCS | Performed by: NURSE PRACTITIONER

## 2024-04-26 PROCEDURE — 3008F BODY MASS INDEX DOCD: CPT | Performed by: NURSE PRACTITIONER

## 2024-04-26 PROCEDURE — 4010F ACE/ARB THERAPY RXD/TAKEN: CPT | Performed by: NURSE PRACTITIONER

## 2024-04-26 PROCEDURE — 3048F LDL-C <100 MG/DL: CPT | Performed by: NURSE PRACTITIONER

## 2024-04-26 PROCEDURE — 3044F HG A1C LEVEL LT 7.0%: CPT | Performed by: NURSE PRACTITIONER

## 2024-04-26 PROCEDURE — 3061F NEG MICROALBUMINURIA REV: CPT | Performed by: NURSE PRACTITIONER

## 2024-04-26 PROCEDURE — 3075F SYST BP GE 130 - 139MM HG: CPT | Performed by: NURSE PRACTITIONER

## 2024-04-26 PROCEDURE — 99214 OFFICE O/P EST MOD 30 MIN: CPT | Performed by: NURSE PRACTITIONER

## 2024-04-26 PROCEDURE — 3079F DIAST BP 80-89 MM HG: CPT | Performed by: NURSE PRACTITIONER

## 2024-04-26 PROCEDURE — 1036F TOBACCO NON-USER: CPT | Performed by: NURSE PRACTITIONER

## 2024-04-26 RX ORDER — ASPIRIN 81 MG/1
81 TABLET ORAL DAILY
Qty: 90 TABLET | Refills: 3 | Status: CANCELLED | OUTPATIENT
Start: 2024-04-26

## 2024-04-26 ASSESSMENT — ENCOUNTER SYMPTOMS
FATIGUE: 0
RESPIRATORY NEGATIVE: 1
VOMITING: 0
FEVER: 0
CONSTIPATION: 0
ABDOMINAL PAIN: 0
PALPITATIONS: 0
WEAKNESS: 0
APNEA: 0
COUGH: 0
NERVOUS/ANXIOUS: 0
CHILLS: 0
DIZZINESS: 0
NAUSEA: 0
SHORTNESS OF BREATH: 0
ACTIVITY CHANGE: 0
CONFUSION: 0
CONSTITUTIONAL NEGATIVE: 1
HEADACHES: 0

## 2024-04-26 NOTE — ASSESSMENT & PLAN NOTE
LDL at goal  Continue statin  Follow-up 6 months with lipid panel  Low saturated fat diet and low trans fat diet

## 2024-04-26 NOTE — ASSESSMENT & PLAN NOTE
Continue metformin at 1000 mg twice daily  Prefers no change to meds  Continue atorvastatin, continue lisinopril  Albumin creatinine ratio unremarkable  ADA diet recommended, increase physical activity

## 2024-04-26 NOTE — ASSESSMENT & PLAN NOTE
.Discussed DASH diet and dietary sodium restrictions  Continue/Increase dietary efforts and physical activity    Continue lisinopril and hydrochlorothiazide  Call for consistently elevated BP greater than 140/90

## 2024-04-26 NOTE — PROGRESS NOTES
Subjective   Patient ID: Alberto Zacarias is a 58 y.o. male who presents for Follow-up (6 month follow up labs ), Hypertension, Hyperlipidemia, Diabetes, b12 deficiency, and GERD.    Type 2 diabetes mellitus: Patient with metformin 1000 mg twice daily.  Hemoglobin A1c slightly increased from 6.1% to 6.9% in last 6 months.  Patient did report that he has not been compliant with an ADA diet.  He would like to hold off on any changes to medications at this time    Hypertension: Patient on lisinopril 10 mg daily and hydrochlorothiazide 12.5 mg.  Blood pressure well-controlled.    Hyperlipidemia: Stable on atorvastatin 20 mg once daily.  LDL at goal    Vitamin B12 deficiency: Patient with slightly elevated B12 level.  Takes over-the-counter dose once daily.  Recommended taking every other day and rechecking at next major lab panel     GERD: Patient on PPI.  Asymptomatic        .  Current Outpatient Medications:   ·  aspirin 81 mg EC tablet, Take 1 tablet (81 mg) by mouth once daily., Disp: 90 tablet, Rfl: 2  ·  atorvastatin (Lipitor) 20 mg tablet, Take 1 tablet (20 mg) by mouth once daily at bedtime., Disp: 90 tablet, Rfl: 3  ·  cyanocobalamin (Vitamin B-12) 1,000 mcg tablet, Take 1 tablet (1,000 mcg) by mouth once daily., Disp: , Rfl:   ·  hydroCHLOROthiazide (HYDRODiuril) 12.5 mg tablet, Take 1 tablet (12.5 mg) by mouth once daily., Disp: 90 tablet, Rfl: 3  ·  lisinopril 10 mg tablet, Take 1 tablet (10 mg) by mouth once daily., Disp: 90 tablet, Rfl: 3  ·  metFORMIN (Glucophage) 1,000 mg tablet, Take 1 tablet (1,000 mg) by mouth 2 times a day., Disp: 180 tablet, Rfl: 3  ·  pantoprazole (ProtoNix) 40 mg EC tablet, Take 1 tablet (40 mg) by mouth once daily., Disp: 90 tablet, Rfl: 3          Review of Systems   Constitutional: Negative.  Negative for activity change, chills, fatigue and fever.   Respiratory: Negative.  Negative for apnea, cough and shortness of breath.    Cardiovascular:  Negative for chest pain and  "palpitations.   Gastrointestinal:  Negative for abdominal pain, constipation, nausea and vomiting.   Neurological:  Negative for dizziness, weakness and headaches.   Psychiatric/Behavioral:  Negative for confusion, self-injury and suicidal ideas. The patient is not nervous/anxious.        Objective   /80   Pulse 70   Temp 35.9 °C (96.7 °F) (Temporal)   Resp 18   Ht 1.778 m (5' 10\")   Wt 93.8 kg (206 lb 12.8 oz)   SpO2 95%   BMI 29.67 kg/m²     Physical Exam  Vitals reviewed.   Constitutional:       Appearance: Normal appearance.   Cardiovascular:      Rate and Rhythm: Normal rate and regular rhythm.      Pulses: Normal pulses.      Heart sounds: Normal heart sounds.   Pulmonary:      Effort: Pulmonary effort is normal.      Breath sounds: Normal breath sounds.   Musculoskeletal:         General: Normal range of motion.   Neurological:      Mental Status: He is alert and oriented to person, place, and time.   Psychiatric:         Mood and Affect: Mood normal.         Behavior: Behavior normal.         Assessment/Plan   Problem List Items Addressed This Visit             ICD-10-CM    Diabetes type 2, controlled (Multi) E11.9     Continue metformin at 1000 mg twice daily  Prefers no change to meds  Continue atorvastatin, continue lisinopril  Albumin creatinine ratio unremarkable  ADA diet recommended, increase physical activity         Relevant Orders    Hemoglobin A1C    Essential hypertension, benign I10     .Discussed DASH diet and dietary sodium restrictions  Continue/Increase dietary efforts and physical activity    Continue lisinopril and hydrochlorothiazide  Call for consistently elevated BP greater than 140/90         Relevant Orders    CBC and Auto Differential    Comprehensive Metabolic Panel    GERD (gastroesophageal reflux disease) K21.9     Well-controlled on PPI  Follow-up 6 months         Mixed hyperlipidemia - Primary E78.2     LDL at goal  Continue statin  Follow-up 6 months with lipid " panel  Low saturated fat diet and low trans fat diet         Relevant Orders    Lipid Panel    B12 deficiency E53.8     Slightly elevated.  Recommend taking every other day and rechecking at next major lab panel         Relevant Orders    Vitamin B12     Other Visit Diagnoses         Codes    Screening PSA (prostate specific antigen)     Z12.5    Relevant Orders    Prostate Specific Antigen, Screen    Thyroid disorder screen     Z13.29    Relevant Orders    TSH with reflex to Free T4 if abnormal

## 2024-10-05 ENCOUNTER — LAB (OUTPATIENT)
Dept: LAB | Facility: LAB | Age: 59
End: 2024-10-05
Payer: COMMERCIAL

## 2024-10-05 DIAGNOSIS — E78.2 MIXED HYPERLIPIDEMIA: ICD-10-CM

## 2024-10-05 DIAGNOSIS — E11.9 CONTROLLED TYPE 2 DIABETES MELLITUS WITHOUT COMPLICATION, WITHOUT LONG-TERM CURRENT USE OF INSULIN (MULTI): ICD-10-CM

## 2024-10-05 DIAGNOSIS — Z12.5 SCREENING PSA (PROSTATE SPECIFIC ANTIGEN): ICD-10-CM

## 2024-10-05 DIAGNOSIS — E53.8 B12 DEFICIENCY: ICD-10-CM

## 2024-10-05 DIAGNOSIS — Z13.29 THYROID DISORDER SCREEN: ICD-10-CM

## 2024-10-05 DIAGNOSIS — I10 ESSENTIAL HYPERTENSION, BENIGN: ICD-10-CM

## 2024-10-05 LAB
ALBUMIN SERPL BCP-MCNC: 4.3 G/DL (ref 3.4–5)
ALP SERPL-CCNC: 62 U/L (ref 33–120)
ALT SERPL W P-5'-P-CCNC: 32 U/L (ref 10–52)
ANION GAP SERPL CALC-SCNC: 9 MMOL/L (ref 10–20)
AST SERPL W P-5'-P-CCNC: 18 U/L (ref 9–39)
BASOPHILS # BLD AUTO: 0.03 X10*3/UL (ref 0–0.1)
BASOPHILS NFR BLD AUTO: 0.7 %
BILIRUB SERPL-MCNC: 1 MG/DL (ref 0–1.2)
BUN SERPL-MCNC: 21 MG/DL (ref 6–23)
CALCIUM SERPL-MCNC: 9.5 MG/DL (ref 8.6–10.3)
CHLORIDE SERPL-SCNC: 105 MMOL/L (ref 98–107)
CHOLEST SERPL-MCNC: 156 MG/DL (ref 0–199)
CHOLESTEROL/HDL RATIO: 3.3
CO2 SERPL-SCNC: 30 MMOL/L (ref 21–32)
CREAT SERPL-MCNC: 0.8 MG/DL (ref 0.5–1.3)
EGFRCR SERPLBLD CKD-EPI 2021: >90 ML/MIN/1.73M*2
EOSINOPHIL # BLD AUTO: 0.07 X10*3/UL (ref 0–0.7)
EOSINOPHIL NFR BLD AUTO: 1.5 %
ERYTHROCYTE [DISTWIDTH] IN BLOOD BY AUTOMATED COUNT: 13 % (ref 11.5–14.5)
EST. AVERAGE GLUCOSE BLD GHB EST-MCNC: 131 MG/DL
GLUCOSE SERPL-MCNC: 126 MG/DL (ref 74–99)
HBA1C MFR BLD: 6.2 %
HCT VFR BLD AUTO: 45.7 % (ref 41–52)
HDLC SERPL-MCNC: 47.9 MG/DL
HGB BLD-MCNC: 15.5 G/DL (ref 13.5–17.5)
IMM GRANULOCYTES # BLD AUTO: 0.02 X10*3/UL (ref 0–0.7)
IMM GRANULOCYTES NFR BLD AUTO: 0.4 % (ref 0–0.9)
LDLC SERPL CALC-MCNC: 96 MG/DL
LDLC SERPL DIRECT ASSAY-MCNC: 100 MG/DL (ref 0–129)
LYMPHOCYTES # BLD AUTO: 1.91 X10*3/UL (ref 1.2–4.8)
LYMPHOCYTES NFR BLD AUTO: 42.1 %
MCH RBC QN AUTO: 30.3 PG (ref 26–34)
MCHC RBC AUTO-ENTMCNC: 33.9 G/DL (ref 32–36)
MCV RBC AUTO: 89 FL (ref 80–100)
MONOCYTES # BLD AUTO: 0.37 X10*3/UL (ref 0.1–1)
MONOCYTES NFR BLD AUTO: 8.1 %
NEUTROPHILS # BLD AUTO: 2.14 X10*3/UL (ref 1.2–7.7)
NEUTROPHILS NFR BLD AUTO: 47.2 %
NON HDL CHOLESTEROL: 108 MG/DL (ref 0–149)
NRBC BLD-RTO: 0 /100 WBCS (ref 0–0)
PLATELET # BLD AUTO: 174 X10*3/UL (ref 150–450)
POTASSIUM SERPL-SCNC: 4.5 MMOL/L (ref 3.5–5.3)
PROT SERPL-MCNC: 6.7 G/DL (ref 6.4–8.2)
PSA SERPL-MCNC: 0.69 NG/ML
PSA SERPL-MCNC: 0.69 NG/ML
RBC # BLD AUTO: 5.12 X10*6/UL (ref 4.5–5.9)
SODIUM SERPL-SCNC: 139 MMOL/L (ref 136–145)
TRIGL SERPL-MCNC: 59 MG/DL (ref 0–149)
TSH SERPL-ACNC: 2.15 MIU/L (ref 0.44–3.98)
VIT B12 SERPL-MCNC: 708 PG/ML (ref 211–911)
VLDL: 12 MG/DL (ref 0–40)
WBC # BLD AUTO: 4.5 X10*3/UL (ref 4.4–11.3)

## 2024-10-05 PROCEDURE — 85025 COMPLETE CBC W/AUTO DIFF WBC: CPT

## 2024-10-05 PROCEDURE — 83721 ASSAY OF BLOOD LIPOPROTEIN: CPT

## 2024-10-14 NOTE — ASSESSMENT & PLAN NOTE
Yearly physical done.  Flu shot, Boostrix, Prevnar 20 declined  Shingrix series complete.  PSA 10/24  Cologuard 9/23  Coronary calcium score 24 6/23  Nonsmoker

## 2024-10-14 NOTE — ASSESSMENT & PLAN NOTE
BP up but off meds  Will restart hydrochlorothiazide and lisinopril as directed   Recheck 1 month

## 2024-10-14 NOTE — PATIENT INSTRUCTIONS
I recommend new COVID booster at the pharmacy.    I would like you to follow up in 6 months  Please have all labs that were ordered done at least 1 week prior to your visit.    I recommend yearly dilated eye exam with your ophthalmologist or optometrist.

## 2024-10-14 NOTE — ASSESSMENT & PLAN NOTE
A1c well controlled   Restart metformin as directed   On ACE, statin  No microalbuminuria 4/24  Work on diet reviewed with patient.   Recheck 6 months

## 2024-10-14 NOTE — PROGRESS NOTES
Subjective   Patient ID: Alberto Zacarias is a 58 y.o. male who presents for Annual Exam.  HPI  Patient presents today for Annual Physical. Changed insurance - has been off meds for several months.  Patient otherwise feels well. No other complaints or concerns.    The patient's relevant past medical, surgical, family, and social history was reviewed in [facility].  All pertinent lab work and results for this visit were reviewed with patient.    Lab on 10/05/2024   Component Date Value Ref Range Status    LDL, Direct 10/05/2024 100  0 - 129 mg/dL Final    Cholesterol 10/05/2024 156  0 - 199 mg/dL Final          Age      Desirable   Borderline High   High     0-19 Y     0 - 169       170 - 199     >/= 200    20-24 Y     0 - 189       190 - 224     >/= 225         >24 Y     0 - 199       200 - 239     >/= 240   **All ranges are based on fasting samples. Specific   therapeutic targets will vary based on patient-specific   cardiac risk.    Pediatric guidelines reference:Pediatrics 2011, 128(S5).Adult guidelines reference: NCEP ATPIII Guidelines,GIL 2001, 258:2486-97    Venipuncture immediately after or during the administration of Metamizole may lead to falsely low results. Testing should be performed immediately prior to Metamizole dosing.    HDL-Cholesterol 10/05/2024 47.9  mg/dL Final      Age       Very Low   Low     Normal    High    0-19 Y    < 35      < 40     40-45     ----  20-24 Y    ----     < 40      >45      ----        >24 Y      ----     < 40     40-60      >60      Cholesterol/HDL Ratio 10/05/2024 3.3   Final      Ref Values  Desirable  < 3.4  High Risk  > 5.0    LDL Calculated 10/05/2024 96  <=99 mg/dL Final                                Near   Borderline      AGE      Desirable  Optimal    High     High     Very High     0-19 Y     0 - 109     ---    110-129   >/= 130     ----    20-24 Y     0 - 119     ---    120-159   >/= 160     ----      >24 Y     0 -  99   100-129  130-159   160-189     >/=190       VLDL 10/05/2024 12  0 - 40 mg/dL Final    Triglycerides 10/05/2024 59  0 - 149 mg/dL Final       Age         Desirable   Borderline High   High     Very High   0 D-90 D    19 - 174         ----         ----        ----  91 D- 9 Y     0 -  74        75 -  99     >/= 100      ----    10-19 Y     0 -  89        90 - 129     >/= 130      ----    20-24 Y     0 - 114       115 - 149     >/= 150      ----         >24 Y     0 - 149       150 - 199    200- 499    >/= 500    Venipuncture immediately after or during the administration of Metamizole may lead to falsely low results. Testing should be performed immediately prior to Metamizole dosing.    Non HDL Cholesterol 10/05/2024 108  0 - 149 mg/dL Final          Age       Desirable   Borderline High   High     Very High     0-19 Y     0 - 119       120 - 144     >/= 145    >/= 160    20-24 Y     0 - 149       150 - 189     >/= 190      ----         >24 Y    30 mg/dL above LDL Cholesterol goal      Glucose 10/05/2024 126 (H)  74 - 99 mg/dL Final    Sodium 10/05/2024 139  136 - 145 mmol/L Final    Potassium 10/05/2024 4.5  3.5 - 5.3 mmol/L Final    Chloride 10/05/2024 105  98 - 107 mmol/L Final    Bicarbonate 10/05/2024 30  21 - 32 mmol/L Final    Anion Gap 10/05/2024 9 (L)  10 - 20 mmol/L Final    Urea Nitrogen 10/05/2024 21  6 - 23 mg/dL Final    Creatinine 10/05/2024 0.80  0.50 - 1.30 mg/dL Final    eGFR 10/05/2024 >90  >60 mL/min/1.73m*2 Final    Calculations of estimated GFR are performed using the 2021 CKD-EPI Study Refit equation without the race variable for the IDMS-Traceable creatinine methods.  https://jasn.asnjournals.org/content/early/2021/09/22/ASN.5218117801    Calcium 10/05/2024 9.5  8.6 - 10.3 mg/dL Final    Albumin 10/05/2024 4.3  3.4 - 5.0 g/dL Final    Alkaline Phosphatase 10/05/2024 62  33 - 120 U/L Final    Total Protein 10/05/2024 6.7  6.4 - 8.2 g/dL Final    AST 10/05/2024 18  9 - 39 U/L Final    Bilirubin, Total 10/05/2024 1.0  0.0 - 1.2 mg/dL Final     ALT 10/05/2024 32  10 - 52 U/L Final    Patients treated with Sulfasalazine may generate falsely decreased results for ALT.    Hemoglobin A1C 10/05/2024 6.2 (H)  See comment % Final    Estimated Average Glucose 10/05/2024 131  Not Established mg/dL Final    Prostate Specific AG 10/05/2024 0.69  <=4.00 ng/mL Final    Vitamin B12 10/05/2024 708  211 - 911 pg/mL Final    WBC 10/05/2024 4.5  4.4 - 11.3 x10*3/uL Final    nRBC 10/05/2024 0.0  0.0 - 0.0 /100 WBCs Final    RBC 10/05/2024 5.12  4.50 - 5.90 x10*6/uL Final    Hemoglobin 10/05/2024 15.5  13.5 - 17.5 g/dL Final    Hematocrit 10/05/2024 45.7  41.0 - 52.0 % Final    MCV 10/05/2024 89  80 - 100 fL Final    MCH 10/05/2024 30.3  26.0 - 34.0 pg Final    MCHC 10/05/2024 33.9  32.0 - 36.0 g/dL Final    RDW 10/05/2024 13.0  11.5 - 14.5 % Final    Platelets 10/05/2024 174  150 - 450 x10*3/uL Final    Neutrophils % 10/05/2024 47.2  40.0 - 80.0 % Final    Immature Granulocytes %, Automated 10/05/2024 0.4  0.0 - 0.9 % Final    Immature Granulocyte Count (IG) includes promyelocytes, myelocytes and metamyelocytes but does not include bands. Percent differential counts (%) should be interpreted in the context of the absolute cell counts (cells/UL).    Lymphocytes % 10/05/2024 42.1  13.0 - 44.0 % Final    Monocytes % 10/05/2024 8.1  2.0 - 10.0 % Final    Eosinophils % 10/05/2024 1.5  0.0 - 6.0 % Final    Basophils % 10/05/2024 0.7  0.0 - 2.0 % Final    Neutrophils Absolute 10/05/2024 2.14  1.20 - 7.70 x10*3/uL Final    Percent differential counts (%) should be interpreted in the context of the absolute cell counts (cells/uL).    Immature Granulocytes Absolute, Au* 10/05/2024 0.02  0.00 - 0.70 x10*3/uL Final    Lymphocytes Absolute 10/05/2024 1.91  1.20 - 4.80 x10*3/uL Final    Monocytes Absolute 10/05/2024 0.37  0.10 - 1.00 x10*3/uL Final    Eosinophils Absolute 10/05/2024 0.07  0.00 - 0.70 x10*3/uL Final    Basophils Absolute 10/05/2024 0.03  0.00 - 0.10 x10*3/uL Final     "Thyroid Stimulating Hormone 10/05/2024 2.15  0.44 - 3.98 mIU/L Final    Prostate Specific Antigen,Screen 10/05/2024 0.69  <=4.00 ng/mL Final           Review of Systems   A complete review of systems was performed and all systems were normal except what is noted in the HPI.        Objective   BP (!) 162/94   Pulse 63   Temp 36.5 °C (97.7 °F)   Ht 1.778 m (5' 10\")   Wt 96.8 kg (213 lb 6.4 oz)   SpO2 95%   BMI 30.62 kg/m²    Physical Exam  Constitutional:       Appearance: Normal appearance.   HENT:      Head: Normocephalic and atraumatic.   Neck:      Vascular: No carotid bruit.   Cardiovascular:      Rate and Rhythm: Normal rate and regular rhythm.      Heart sounds: Normal heart sounds.   Pulmonary:      Effort: Pulmonary effort is normal.      Breath sounds: Normal breath sounds. No wheezing, rhonchi or rales.   Abdominal:      General: Abdomen is flat. Bowel sounds are normal.      Palpations: Abdomen is soft.      Tenderness: There is no abdominal tenderness. There is no guarding.   Musculoskeletal:         General: Normal range of motion.      Right lower leg: No edema.      Left lower leg: No edema.   Skin:     General: Skin is dry.   Neurological:      General: No focal deficit present.      Mental Status: He is alert and oriented to person, place, and time.   Psychiatric:         Mood and Affect: Mood normal.         Behavior: Behavior normal.         Thought Content: Thought content normal.         Health Maintenance Due   Topic Date Due    HIV Screening  Never done    Pneumococcal Vaccine: Pediatrics (0 to 5 Years) and At-Risk Patients (6 to 64 Years) (1 of 2 - PCV) Never done    Diabetes: Retinopathy Screening  Never done    Influenza Vaccine (1) Never done    COVID-19 Vaccine (5 - 2024-25 season) 09/01/2024    Yearly Adult Physical  10/21/2024        Assessment/Plan   Problem List Items Addressed This Visit       Diabetes type 2, controlled (Multi)     A1c well controlled   Restart metformin as " directed   On ACE, statin  No microalbuminuria 4/24  Work on diet reviewed with patient.   Recheck 6 months          Relevant Orders    Comprehensive Metabolic Panel    Albumin-Creatinine Ratio, Urine Random    Hemoglobin A1C    Essential hypertension, benign     BP up but off meds  Will restart hydrochlorothiazide and lisinopril as directed   Recheck 1 month            Relevant Orders    Comprehensive Metabolic Panel    Mixed hyperlipidemia     Restart statin as directed  Continue current medicine and recheck in 6 months.           Relevant Orders    Cholesterol, LDL Direct    Lipid Panel    Annual physical exam - Primary     Yearly physical done.  Flu shot, Boostrix, Prevnar 20 declined  Shingrix series complete.  PSA 10/24  Cologuard 9/23  Coronary calcium score 24 6/23  Nonsmoker         B12 deficiency     Well controlled. Continue current medicine and recheck in 6 months.           Relevant Orders    Vitamin B12         Patient understands and agrees with care plan.           Risa Dubose MD

## 2024-10-18 ENCOUNTER — APPOINTMENT (OUTPATIENT)
Dept: PRIMARY CARE | Facility: CLINIC | Age: 59
End: 2024-10-18
Payer: COMMERCIAL

## 2024-10-18 VITALS
TEMPERATURE: 97.7 F | DIASTOLIC BLOOD PRESSURE: 94 MMHG | SYSTOLIC BLOOD PRESSURE: 162 MMHG | OXYGEN SATURATION: 95 % | BODY MASS INDEX: 30.55 KG/M2 | HEIGHT: 70 IN | HEART RATE: 63 BPM | WEIGHT: 213.4 LBS

## 2024-10-18 DIAGNOSIS — E11.9 CONTROLLED TYPE 2 DIABETES MELLITUS WITHOUT COMPLICATION, WITHOUT LONG-TERM CURRENT USE OF INSULIN (MULTI): ICD-10-CM

## 2024-10-18 DIAGNOSIS — E53.8 B12 DEFICIENCY: ICD-10-CM

## 2024-10-18 DIAGNOSIS — E78.2 MIXED HYPERLIPIDEMIA: ICD-10-CM

## 2024-10-18 DIAGNOSIS — Z00.00 ANNUAL PHYSICAL EXAM: Primary | ICD-10-CM

## 2024-10-18 DIAGNOSIS — K21.9 GASTROESOPHAGEAL REFLUX DISEASE, UNSPECIFIED WHETHER ESOPHAGITIS PRESENT: ICD-10-CM

## 2024-10-18 DIAGNOSIS — I10 ESSENTIAL HYPERTENSION, BENIGN: ICD-10-CM

## 2024-10-18 PROCEDURE — 3044F HG A1C LEVEL LT 7.0%: CPT | Performed by: FAMILY MEDICINE

## 2024-10-18 PROCEDURE — 99396 PREV VISIT EST AGE 40-64: CPT | Performed by: FAMILY MEDICINE

## 2024-10-18 PROCEDURE — 3049F LDL-C 100-129 MG/DL: CPT | Performed by: FAMILY MEDICINE

## 2024-10-18 PROCEDURE — 3008F BODY MASS INDEX DOCD: CPT | Performed by: FAMILY MEDICINE

## 2024-10-18 PROCEDURE — 3061F NEG MICROALBUMINURIA REV: CPT | Performed by: FAMILY MEDICINE

## 2024-10-18 PROCEDURE — 3080F DIAST BP >= 90 MM HG: CPT | Performed by: FAMILY MEDICINE

## 2024-10-18 PROCEDURE — 1036F TOBACCO NON-USER: CPT | Performed by: FAMILY MEDICINE

## 2024-10-18 PROCEDURE — 3077F SYST BP >= 140 MM HG: CPT | Performed by: FAMILY MEDICINE

## 2024-10-18 PROCEDURE — 4010F ACE/ARB THERAPY RXD/TAKEN: CPT | Performed by: FAMILY MEDICINE

## 2024-10-18 PROCEDURE — 99214 OFFICE O/P EST MOD 30 MIN: CPT | Performed by: FAMILY MEDICINE

## 2024-10-18 RX ORDER — LISINOPRIL 10 MG/1
10 TABLET ORAL DAILY
Qty: 90 TABLET | Refills: 3 | Status: SHIPPED | OUTPATIENT
Start: 2024-10-18 | End: 2025-10-18

## 2024-10-18 RX ORDER — ATORVASTATIN CALCIUM 20 MG/1
20 TABLET, FILM COATED ORAL NIGHTLY
Qty: 90 TABLET | Refills: 3 | Status: SHIPPED | OUTPATIENT
Start: 2024-10-18 | End: 2025-10-18

## 2024-10-18 RX ORDER — HYDROCHLOROTHIAZIDE 12.5 MG/1
12.5 TABLET ORAL DAILY
Qty: 90 TABLET | Refills: 3 | Status: SHIPPED | OUTPATIENT
Start: 2024-10-18 | End: 2025-10-18

## 2024-10-18 RX ORDER — PANTOPRAZOLE SODIUM 40 MG/1
40 TABLET, DELAYED RELEASE ORAL DAILY
Qty: 90 TABLET | Refills: 3 | Status: SHIPPED | OUTPATIENT
Start: 2024-10-18 | End: 2025-10-18

## 2024-10-18 ASSESSMENT — PATIENT HEALTH QUESTIONNAIRE - PHQ9
2. FEELING DOWN, DEPRESSED OR HOPELESS: NOT AT ALL
1. LITTLE INTEREST OR PLEASURE IN DOING THINGS: NOT AT ALL
SUM OF ALL RESPONSES TO PHQ9 QUESTIONS 1 AND 2: 0

## 2024-11-22 ENCOUNTER — APPOINTMENT (OUTPATIENT)
Dept: PRIMARY CARE | Facility: CLINIC | Age: 59
End: 2024-11-22
Payer: COMMERCIAL

## 2024-11-22 VITALS
TEMPERATURE: 96.4 F | RESPIRATION RATE: 16 BRPM | OXYGEN SATURATION: 94 % | DIASTOLIC BLOOD PRESSURE: 88 MMHG | WEIGHT: 215.2 LBS | SYSTOLIC BLOOD PRESSURE: 150 MMHG | HEART RATE: 58 BPM | BODY MASS INDEX: 30.88 KG/M2

## 2024-11-22 DIAGNOSIS — I10 ESSENTIAL HYPERTENSION, BENIGN: ICD-10-CM

## 2024-11-22 DIAGNOSIS — E11.9 CONTROLLED TYPE 2 DIABETES MELLITUS WITHOUT COMPLICATION, WITHOUT LONG-TERM CURRENT USE OF INSULIN (MULTI): ICD-10-CM

## 2024-11-22 PROCEDURE — 3044F HG A1C LEVEL LT 7.0%: CPT | Performed by: NURSE PRACTITIONER

## 2024-11-22 PROCEDURE — 3061F NEG MICROALBUMINURIA REV: CPT | Performed by: NURSE PRACTITIONER

## 2024-11-22 PROCEDURE — 3079F DIAST BP 80-89 MM HG: CPT | Performed by: NURSE PRACTITIONER

## 2024-11-22 PROCEDURE — 3077F SYST BP >= 140 MM HG: CPT | Performed by: NURSE PRACTITIONER

## 2024-11-22 PROCEDURE — 4010F ACE/ARB THERAPY RXD/TAKEN: CPT | Performed by: NURSE PRACTITIONER

## 2024-11-22 PROCEDURE — 1036F TOBACCO NON-USER: CPT | Performed by: NURSE PRACTITIONER

## 2024-11-22 PROCEDURE — 3049F LDL-C 100-129 MG/DL: CPT | Performed by: NURSE PRACTITIONER

## 2024-11-22 PROCEDURE — 99213 OFFICE O/P EST LOW 20 MIN: CPT | Performed by: NURSE PRACTITIONER

## 2024-11-22 RX ORDER — METFORMIN HYDROCHLORIDE 1000 MG/1
1000 TABLET ORAL 2 TIMES DAILY
Qty: 180 TABLET | Refills: 3 | Status: SHIPPED | OUTPATIENT
Start: 2024-11-22 | End: 2025-11-22

## 2024-11-22 RX ORDER — LISINOPRIL AND HYDROCHLOROTHIAZIDE 12.5; 2 MG/1; MG/1
1 TABLET ORAL DAILY
Qty: 100 TABLET | Refills: 3 | Status: SHIPPED | OUTPATIENT
Start: 2024-11-22 | End: 2025-12-27

## 2024-11-22 ASSESSMENT — ENCOUNTER SYMPTOMS
NAUSEA: 0
NERVOUS/ANXIOUS: 0
FEVER: 0
DIZZINESS: 0
COUGH: 0
CHILLS: 0
SHORTNESS OF BREATH: 0
HEADACHES: 0
CONFUSION: 0
ACTIVITY CHANGE: 0
WEAKNESS: 0
PALPITATIONS: 0
ABDOMINAL PAIN: 0
VOMITING: 0
FATIGUE: 0
APNEA: 0
CONSTITUTIONAL NEGATIVE: 1
RESPIRATORY NEGATIVE: 1

## 2024-11-22 NOTE — PROGRESS NOTES
Subjective   Patient ID: Alberto Zacarias is a 58 y.o. male who presents for Hypertension and Diabetes.    Hypertension: BP still significantly elevated with systolic ranging 150s and diastolic greater than 85.  Asymptomatic  On lisinopril and hydrochlorothiazide.    Type 2 diabetes mellitus: Well-controlled on metformin.  Last hemoglobin A1c 6.2%.  Needs refill on metformin         Review of Systems   Constitutional: Negative.  Negative for activity change, chills, fatigue and fever.   Respiratory: Negative.  Negative for apnea, cough and shortness of breath.    Cardiovascular:  Negative for chest pain and palpitations.   Gastrointestinal:  Negative for abdominal pain, nausea and vomiting.   Neurological:  Negative for dizziness, weakness and headaches.   Psychiatric/Behavioral:  Negative for confusion. The patient is not nervous/anxious.        Objective   /88   Pulse 58   Temp 35.8 °C (96.4 °F) (Temporal)   Resp 16   Wt 97.6 kg (215 lb 3.2 oz)   SpO2 94%   BMI 30.88 kg/m²     Physical Exam  Vitals reviewed.   Constitutional:       Appearance: Normal appearance.   HENT:      Head: Normocephalic.   Cardiovascular:      Rate and Rhythm: Normal rate and regular rhythm.      Pulses: Normal pulses.      Heart sounds: Normal heart sounds.   Pulmonary:      Effort: Pulmonary effort is normal. No respiratory distress.      Breath sounds: Normal breath sounds. No wheezing.   Abdominal:      General: Bowel sounds are normal.   Neurological:      General: No focal deficit present.      Mental Status: He is alert and oriented to person, place, and time.   Psychiatric:         Mood and Affect: Mood normal.         Behavior: Behavior normal.         Assessment/Plan   Problem List Items Addressed This Visit             ICD-10-CM    Diabetes type 2, controlled (Multi) E11.9     Follow-up A1c 6 months  Continue metformin         Relevant Medications    metFORMIN (Glucophage) 1,000 mg tablet    Essential hypertension,  benign I10     Increase lisinopril to 20 mg.  Prescribed lisinopril 20 mg-hydrochlorothiazide 12.5 mg combination pill to take once daily  Check BP daily and call for consistently elevated BP greater than 140/90  No added salt diet, increase physical activity  Follow-up 1 month virtual         Relevant Medications    lisinopriL-hydrochlorothiazide 20-12.5 mg tablet

## 2024-11-22 NOTE — ASSESSMENT & PLAN NOTE
Increase lisinopril to 20 mg.  Prescribed lisinopril 20 mg-hydrochlorothiazide 12.5 mg combination pill to take once daily  Check BP daily and call for consistently elevated BP greater than 140/90  No added salt diet, increase physical activity  Follow-up 1 month virtual

## 2025-01-03 ENCOUNTER — APPOINTMENT (OUTPATIENT)
Dept: PRIMARY CARE | Facility: CLINIC | Age: 60
End: 2025-01-03
Payer: COMMERCIAL

## 2025-01-03 DIAGNOSIS — I10 ESSENTIAL HYPERTENSION, BENIGN: Primary | ICD-10-CM

## 2025-01-03 PROCEDURE — 99213 OFFICE O/P EST LOW 20 MIN: CPT | Performed by: NURSE PRACTITIONER

## 2025-01-03 ASSESSMENT — ENCOUNTER SYMPTOMS
HEADACHES: 0
FATIGUE: 0
WEAKNESS: 0
BLURRED VISION: 0
FEVER: 0
SHORTNESS OF BREATH: 0
HYPERTENSION: 1
PALPITATIONS: 0
SWEATS: 0
ORTHOPNEA: 0
DIZZINESS: 0
NECK PAIN: 0
CHILLS: 0
PND: 0

## 2025-01-03 NOTE — PROGRESS NOTES
Subjective   Patient ID: Alberto Zacarias is a 59 y.o. male who presents for Hypertension.    .Virtual or Telephone Consent    An interactive audio and video telecommunication system which permits real time communications between the patient (at the originating site) and provider (at the distant site) was utilized to provide this telehealth service.   Verbal consent was requested and obtained from Alberto Zacarias on this date, 01/03/25 for a telehealth visit.      Hypertension: Increase blood pressure meds to lisinopril 20-hydrochlorothiazide 12.5 mg on 11/22/2024.  Recently started medication.  Patient completed lisinopril 20 mg at home prior to starting new prescription.  Only 1 to 2 weeks on medication but feels better.  Reports blood pressures are much well-controlled with average systolic 130s and average diastolic 77.  Takes blood pressure daily in the evenings.    Hypertension  This is a recurrent problem. The current episode started more than 1 year ago. The problem has been resolved since onset. The problem is controlled. Pertinent negatives include no anxiety, blurred vision, chest pain, headaches, malaise/fatigue, neck pain, orthopnea, palpitations, peripheral edema, PND, shortness of breath or sweats. There are no associated agents to hypertension. Risk factors for coronary artery disease include diabetes mellitus. There are no compliance problems.         Review of Systems   Constitutional:  Negative for chills, fatigue, fever and malaise/fatigue.   Eyes:  Negative for blurred vision.   Respiratory:  Negative for shortness of breath.    Cardiovascular:  Negative for chest pain, palpitations, orthopnea and PND.   Musculoskeletal:  Negative for neck pain.   Neurological:  Negative for dizziness, weakness and headaches.       Objective   There were no vitals taken for this visit.    Physical Exam  Neurological:      Mental Status: He is oriented to person, place, and time.   Psychiatric:         Mood  and Affect: Mood normal.         Behavior: Behavior normal.         Assessment/Plan   Problem List Items Addressed This Visit             ICD-10-CM    Essential hypertension, benign - Primary I10     Much improved.  Continue lisinopril-hydrochlorothiazide  Continue checking blood pressure daily  Follow-up with PCP

## 2025-02-07 ENCOUNTER — APPOINTMENT (OUTPATIENT)
Dept: PRIMARY CARE | Facility: CLINIC | Age: 60
End: 2025-02-07
Payer: COMMERCIAL

## 2025-05-02 ENCOUNTER — APPOINTMENT (OUTPATIENT)
Dept: PRIMARY CARE | Facility: CLINIC | Age: 60
End: 2025-05-02
Payer: COMMERCIAL

## 2025-06-04 ENCOUNTER — TELEPHONE (OUTPATIENT)
Dept: PRIMARY CARE | Facility: CLINIC | Age: 60
End: 2025-06-04
Payer: COMMERCIAL

## 2025-06-04 DIAGNOSIS — K21.9 GASTROESOPHAGEAL REFLUX DISEASE, UNSPECIFIED WHETHER ESOPHAGITIS PRESENT: ICD-10-CM

## 2025-06-04 RX ORDER — PANTOPRAZOLE SODIUM 40 MG/1
40 TABLET, DELAYED RELEASE ORAL DAILY
Qty: 90 TABLET | Refills: 1 | Status: SHIPPED | OUTPATIENT
Start: 2025-06-04

## 2025-06-04 NOTE — TELEPHONE ENCOUNTER
Elbert called to get a refill on the following medication:    He switched insurance and can no longer use [facility].     Pantoprazole 40 mg 1 tablet daily    Pharmacy: [facility]    Last seen: 1/3/2025  Future appointment: 9/26/2025

## 2025-07-18 ENCOUNTER — TELEPHONE (OUTPATIENT)
Dept: PRIMARY CARE | Facility: CLINIC | Age: 60
End: 2025-07-18
Payer: COMMERCIAL

## 2025-07-18 DIAGNOSIS — E78.2 MIXED HYPERLIPIDEMIA: ICD-10-CM

## 2025-07-18 DIAGNOSIS — I10 ESSENTIAL HYPERTENSION, BENIGN: ICD-10-CM

## 2025-07-18 DIAGNOSIS — E11.9 CONTROLLED TYPE 2 DIABETES MELLITUS WITHOUT COMPLICATION, WITHOUT LONG-TERM CURRENT USE OF INSULIN: ICD-10-CM

## 2025-07-18 RX ORDER — METFORMIN HYDROCHLORIDE 1000 MG/1
1000 TABLET ORAL 2 TIMES DAILY
Qty: 180 TABLET | Refills: 0 | Status: SHIPPED | OUTPATIENT
Start: 2025-07-18 | End: 2026-07-18

## 2025-07-18 RX ORDER — ATORVASTATIN CALCIUM 20 MG/1
20 TABLET, FILM COATED ORAL NIGHTLY
Qty: 90 TABLET | Refills: 0 | Status: SHIPPED | OUTPATIENT
Start: 2025-07-18 | End: 2026-07-18

## 2025-07-18 RX ORDER — LISINOPRIL AND HYDROCHLOROTHIAZIDE 12.5; 2 MG/1; MG/1
1 TABLET ORAL DAILY
Qty: 100 TABLET | Refills: 0 | Status: SHIPPED | OUTPATIENT
Start: 2025-07-18 | End: 2026-08-22

## 2025-07-18 NOTE — TELEPHONE ENCOUNTER
Elbert called to get refills on the following medications for Dr. Dubose:    Metformin 1000 mg 1 tablet 2 times a day    Lisinopril-hydrochlorothiazide 20-12.5 mg 1 tablet daily    Atorvastatin 20 mg 1 tablet daily at bedtime    Pharmacy: [facility]    Last seen: 1/3/2025 [facility]    Future appointment: 9/26/2025 Dr. Dubose

## 2025-09-26 ENCOUNTER — APPOINTMENT (OUTPATIENT)
Dept: PRIMARY CARE | Facility: CLINIC | Age: 60
End: 2025-09-26
Payer: COMMERCIAL